# Patient Record
Sex: MALE | Race: WHITE | Employment: FULL TIME | URBAN - METROPOLITAN AREA
[De-identification: names, ages, dates, MRNs, and addresses within clinical notes are randomized per-mention and may not be internally consistent; named-entity substitution may affect disease eponyms.]

---
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
## 2021-10-13 ENCOUNTER — HOSPITAL ENCOUNTER (EMERGENCY)
Age: 21
Discharge: HOME | End: 2021-10-13
Attending: STUDENT IN AN ORGANIZED HEALTH CARE EDUCATION/TRAINING PROGRAM
Payer: COMMERCIAL

## 2021-10-13 ENCOUNTER — APPOINTMENT (OUTPATIENT)
Dept: GENERAL RADIOLOGY | Age: 21
End: 2021-10-13
Attending: PHYSICIAN ASSISTANT
Payer: COMMERCIAL

## 2021-10-13 VITALS
OXYGEN SATURATION: 95 % | BODY MASS INDEX: 22.9 KG/M2 | RESPIRATION RATE: 20 BRPM | SYSTOLIC BLOOD PRESSURE: 145 MMHG | TEMPERATURE: 97.7 F | DIASTOLIC BLOOD PRESSURE: 98 MMHG | HEIGHT: 70 IN | WEIGHT: 160 LBS | HEART RATE: 85 BPM

## 2021-10-13 DIAGNOSIS — R06.02 SOB (SHORTNESS OF BREATH): Primary | ICD-10-CM

## 2021-10-13 DIAGNOSIS — J45.901 EXACERBATION OF ASTHMA, UNSPECIFIED ASTHMA SEVERITY, UNSPECIFIED WHETHER PERSISTENT: ICD-10-CM

## 2021-10-13 PROCEDURE — 74011250637 HC RX REV CODE- 250/637: Performed by: PHYSICIAN ASSISTANT

## 2021-10-13 PROCEDURE — 71045 X-RAY EXAM CHEST 1 VIEW: CPT

## 2021-10-13 PROCEDURE — 94640 AIRWAY INHALATION TREATMENT: CPT

## 2021-10-13 PROCEDURE — 74011250636 HC RX REV CODE- 250/636: Performed by: PHYSICIAN ASSISTANT

## 2021-10-13 PROCEDURE — 99282 EMERGENCY DEPT VISIT SF MDM: CPT

## 2021-10-13 RX ORDER — ALBUTEROL SULFATE 90 UG/1
4 AEROSOL, METERED RESPIRATORY (INHALATION) ONCE
Status: COMPLETED | OUTPATIENT
Start: 2021-10-13 | End: 2021-10-13

## 2021-10-13 RX ORDER — ALBUTEROL SULFATE 0.83 MG/ML
2.5 SOLUTION RESPIRATORY (INHALATION)
Qty: 30 NEBULE | Refills: 0 | Status: SHIPPED | OUTPATIENT
Start: 2021-10-13

## 2021-10-13 RX ORDER — ALBUTEROL SULFATE 90 UG/1
4 AEROSOL, METERED RESPIRATORY (INHALATION)
Qty: 18 G | Refills: 0 | Status: SHIPPED | OUTPATIENT
Start: 2021-10-13

## 2021-10-13 RX ORDER — DEXAMETHASONE 6 MG/1
12 TABLET ORAL ONCE
Status: COMPLETED | OUTPATIENT
Start: 2021-10-13 | End: 2021-10-13

## 2021-10-13 RX ADMIN — ALBUTEROL SULFATE 4 PUFF: 108 INHALANT RESPIRATORY (INHALATION) at 19:53

## 2021-10-13 RX ADMIN — IPRATROPIUM BROMIDE AND ALBUTEROL 4 PUFF: 20; 100 SPRAY, METERED RESPIRATORY (INHALATION) at 18:59

## 2021-10-13 RX ADMIN — DEXAMETHASONE 12 MG: 6 TABLET ORAL at 20:41

## 2021-10-13 NOTE — Clinical Note
[address]  [facility] EMERGENCY DEPT  [address] [id number]  [phone]    Work/School Note    Date: 10/13/2021    To Whom It May concern:    Lacey Shelton was seen and treated today in the emergency room by the following provider(s):  Attending Provider: Ana Rucker MD  Physician Assistant: Marjorie Potts. Lacey Shelton is excused from work/school on 10/13/21 and 10/14/21. He is medically clear to return to work/school on 10/15/2021.        Sincerely,          Remington Coelho PA-C

## 2021-10-13 NOTE — ED PROVIDER NOTES
Patient Call: Voicemail  Date/Time: 8/14/20 1:11PM  Reason for call: Adilia is calling from [facility] Pulmonary Department to help coordinate getting pt set up for further testing and to see the doctor. Please call Adilia back directly at [phone].  Orders can be faxed to [phone], ATTN: Adilia     Ronaldo Gaines is a 24 y.o. male with PMhx of asthma who presents to ED with cc of shortness of breath. Pt states he had a sore throat on Sunday along with some cough and congestion. Notes cough is productive of green/clear mucus. States he has taken Mucinex for this. States he had temperature of 99 yesterday, denies further fevers. Denies chest pain, abdominal pain, nausea, vomiting. States he had some loose stools. Started with SOB (notes hx of asthma) on Monday, went to [facility]. States they gave him a steroid injection which helped symptoms. States he has been using home nebulizer every 4 hours with only short relief. States he is not vaccinated for COVID, denies known exposure. He notes however that his girlfriend started with a cough today. PMHx: Reviewed, as below. PSHx: Reviewed, as below. PCP: Kamryn Avilez, DO    There are no other complaints verbalized at this time. No past medical history on file. No past surgical history on file. No family history on file. Social History     Socioeconomic History    Marital status: SINGLE     Spouse name: Not on file    Number of children: Not on file    Years of education: Not on file    Highest education level: Not on file   Occupational History    Not on file   Tobacco Use    Smoking status: Not on file   Substance and Sexual Activity    Alcohol use: Not on file    Drug use: Not on file    Sexual activity: Not on file   Other Topics Concern    Not on file   Social History Narrative    Not on file     Social Determinants of Health     Financial Resource Strain:     Difficulty of Paying Living Expenses:    Food Insecurity:     Worried About Running Out of Food in the Last Year:     [address] in the Last Year:    Transportation Needs:     Lack of Transportation (Medical):      Lack of Transportation (Non-Medical):    Physical Activity:     Days of Exercise per Week:     Minutes of Exercise per Session:    Stress:     Feeling of Stress :    Social Connections:     Frequency of Communication with Friends and Family:     Frequency of Social Gatherings with Friends and Family:     Attends [employer] Services:     Active Member of Clubs or Organizations:     Attends Club or Organization Meetings:     Marital Status:    Intimate Partner Violence:     Fear of Current or Ex-Partner:     Emotionally Abused:     Physically Abused:     Sexually Abused: ALLERGIES: Patient has no known allergies. Review of Systems   Constitutional: Negative for fever. HENT: Positive for congestion and sore throat. Respiratory: Positive for cough and shortness of breath. Cardiovascular: Negative for chest pain. Gastrointestinal: Negative for abdominal pain, constipation, diarrhea, nausea and vomiting. All other systems reviewed and are negative. Vitals:    10/13/21 1622   BP: (!) 145/98   Pulse: 85   Resp: 20   Temp: 97.7 °F (36.5 °C)   SpO2: 95%   Weight: 72.6 kg (160 lb)   Height: 5' 10\" (1.778 m)            Physical Exam  Vitals and nursing note reviewed. Constitutional:       Appearance: He is not diaphoretic. HENT:      Head: Normocephalic. Right Ear: External ear normal.      Left Ear: External ear normal.   Eyes:      General: No scleral icterus. Cardiovascular:      Rate and Rhythm: Normal rate and regular rhythm. Heart sounds: Normal heart sounds. No murmur heard. Pulmonary:      Effort: Pulmonary effort is normal. No respiratory distress. Breath sounds: No stridor. Wheezing (throughout all lung fields) present. No rhonchi or rales. Abdominal:      General: Bowel sounds are normal. There is no distension. Palpations: Abdomen is soft. There is no mass. Tenderness: There is no abdominal tenderness. There is no guarding or rebound. Hernia: No hernia is present. Musculoskeletal:         General: No swelling or deformity.       Cervical back: Normal range of motion. Skin:     General: Skin is warm and dry. Neurological:      Mental Status: He is alert and oriented to person, place, and time. Mental status is at baseline. MDM  Number of Diagnoses or Management Options     Amount and/or Complexity of Data Reviewed  Clinical lab tests: ordered and reviewed  Tests in the radiology section of CPT®: ordered and reviewed  Discuss the patient with other providers: yes (Dr Diana Noble, ED attending)          Procedures          5:18 PM  Pt states he had a negative rapid COVID test yesterday at [facility]. Declines repeat. States \"I do not want that thing all the way at my nose \"and notes they remained superficially around his nares with test yesterday. 7:37 PM  Patient reassessed. We have discussed results of his chest x-ray. Repeat physical exam demonstrating improved wheezing from previous. 9:08 PM  Pt reassessed. States his symptoms feel improved. He states \"I did some exercise and did not get out of breath as before\". Repeat physical exam demonstrating some end expiratory wheezes however still improved from previous. He maintains declining Covid test AMA. VITAL SIGNS:  Vitals:    10/13/21 1622   BP: (!) 145/98   Pulse: 85   Resp: 20   Temp: 97.7 °F (36.5 °C)   SpO2: 95%   Weight: 72.6 kg (160 lb)   Height: 5' 10\" (1.778 m)         LABS:  No results found for this or any previous visit (from the past 24 hour(s)). IMAGING:  XR CHEST PORT   Final Result   No acute process. Medications During Visit:  Medications   ipratropium-albuterol (COMBIVENT RESPIMAT) 20 mcg-100 mcg inhalation spray (4 Puffs Inhalation Given 10/13/21 1859)   albuterol (PROVENTIL HFA, VENTOLIN HFA, PROAIR HFA) inhaler 4 Puff (4 Puffs Inhalation Given 10/13/21 1953)   dexAMETHasone (DECADRON) tablet 12 mg (12 mg Oral Given 10/13/21 2041)         DECISION MAKING:    Cordell Emmanuel is a 24 y.o. male who comes in as above.   Presents with history of asthma. Describes upper respiratory symptoms of cough, congestion, sore throat since Sunday for which she has taken Mucinex. Had negative rapid Covid test at [facility] yesterday, declined further testing in ED today. Chest x-ray without acute findings. Patient with wheezing on initial physical exam; has been given 4 puffs Combivent followed by 4 puffs albuterol alone, notes subjective improvement as well demonstrates improvement on physical exam.  Maintains without retractions, increased work of breathing, stridor or other signs of acutely impending respiratory compromise during his stay. 95% on RA. We have discussed taking 4 puffs albuterol as needed every 46 hours over the next 2-3 days. He has been given Decadron while in ED. Will have follow-up with his PCP. I have discussed care with ED attending. Pt and I have discussed close return precautions as well as follow up recommendations. Opportunity for questions presented. Pt verbalizes their understanding and agreement with care plan. IMPRESSION:  1. SOB (shortness of breath)    2. Exacerbation of asthma, unspecified asthma severity, unspecified whether persistent        DISPOSITION:  Discharged      Discharge Medication List as of 10/13/2021  9:12 PM      START taking these medications    Details   albuterol (PROVENTIL VENTOLIN) 2.5 mg /3 mL (0.083 %) nebu 3 mL by Nebulization route every four (4) hours as needed for Wheezing., Normal, Disp-30 Nebule, R-0      albuterol (PROVENTIL HFA, VENTOLIN HFA, PROAIR HFA) 90 mcg/actuation inhaler Take 4 Puffs by inhalation every four (4) hours as needed for Wheezing., Normal, Disp-18 g, R-0              Follow-up Information     Follow up With Specialties Details Why Contact Quinn Cooney, DO Family Medicine Call  Please call in the morning regarding your symptoms.  [address]  [phone]      [facility] EMERGENCY DEPT Emergency Medicine Go to  As needed, If symptoms worsen [address] [place] [address]  [phone]            The patient is asked to follow-up with their primary care provider and any other physicians as above. We have discussed strict return precautions and the patient is asked to return promptly for any increased concerns or worsening of symptoms and for return precautions regarding their symptoms today. They can return to this emergency department or any other emergency department. I have discussed with them results as above and presented opportunity for questions. They verbalize their understanding of the above and agreement with care plan. Please note that this dictation was completed with [employer], the [employer] voice recognition software. Quite often unanticipated grammatical, syntax, homophones, and other interpretive errors are inadvertently transcribed by the computer software. Please disregard these errors. Please excuse any errors that have escaped final proofreading.

## 2021-10-13 NOTE — ED TRIAGE NOTES
Pt reports a sore throat onset 3 days ago and SOB onset a couple days ago. Hx of asthma and has been using inhalers and nebulizers at home without relief. Seen at [facility] yesterday and given a shot of steroids with some relief. Worse with walking and talking.

## 2024-11-02 ENCOUNTER — HOSPITAL ENCOUNTER (EMERGENCY)
Facility: HOSPITAL | Age: 24
Discharge: HOME | End: 2024-11-02
Attending: STUDENT IN AN ORGANIZED HEALTH CARE EDUCATION/TRAINING PROGRAM
Payer: COMMERCIAL

## 2024-11-02 VITALS
WEIGHT: 160 LBS | OXYGEN SATURATION: 97 % | HEIGHT: 70 IN | RESPIRATION RATE: 16 BRPM | TEMPERATURE: 97.9 F | BODY MASS INDEX: 22.9 KG/M2 | DIASTOLIC BLOOD PRESSURE: 96 MMHG | SYSTOLIC BLOOD PRESSURE: 159 MMHG | HEART RATE: 82 BPM

## 2024-11-02 DIAGNOSIS — S02.2XXA CLOSED FRACTURE OF NASAL BONE, INITIAL ENCOUNTER: Primary | ICD-10-CM

## 2024-11-02 PROCEDURE — 99283 EMERGENCY DEPT VISIT LOW MDM: CPT

## 2024-11-02 RX ORDER — OXYMETAZOLINE HYDROCHLORIDE 0.05 G/100ML
2 SPRAY NASAL 2 TIMES DAILY
Qty: 12 ML | Refills: 0 | Status: SHIPPED | OUTPATIENT
Start: 2024-11-02 | End: 2024-12-02

## 2024-11-02 RX ORDER — ACETAMINOPHEN 325 MG/1
650 TABLET ORAL EVERY 6 HOURS PRN
Qty: 120 TABLET | Refills: 0 | Status: SHIPPED | OUTPATIENT
Start: 2024-11-02

## 2024-11-02 RX ORDER — IBUPROFEN 600 MG/1
600 TABLET, FILM COATED ORAL 3 TIMES DAILY PRN
Qty: 30 TABLET | Refills: 0 | Status: SHIPPED | OUTPATIENT
Start: 2024-11-02

## 2024-11-02 ASSESSMENT — PAIN DESCRIPTION - PAIN TYPE: TYPE: ACUTE PAIN

## 2024-11-02 ASSESSMENT — PAIN DESCRIPTION - DESCRIPTORS: DESCRIPTORS: ACHING

## 2024-11-02 ASSESSMENT — PAIN SCALES - GENERAL: PAINLEVEL_OUTOF10: 5

## 2024-11-02 ASSESSMENT — PAIN - FUNCTIONAL ASSESSMENT: PAIN_FUNCTIONAL_ASSESSMENT: 0-10

## 2024-11-02 ASSESSMENT — PAIN DESCRIPTION - LOCATION: LOCATION: NOSE
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
## 2024-11-02 NOTE — DISCHARGE INSTRUCTIONS
You should follow sinus precautions as below:    Do not blow your nose for at least two weeks.  Slight bleeding from the nose is not uncommon. You may use direct pressure to stop bleeding as we discussed. Sneeze with an open mouth. Drink without a straw for one week. Avoid swimming for one month.    You may take 600mg ibuprofen every 6 hours or tylenol 650mg every 6 hours as needed for pain. If needed, you can alternate these medications so that you take one medication every 3 hours. For instance, at noon take ibuprofen, then at 3pm take tylenol, then at 6pm take ibuprofen.     Call to schedule follow up with ENT this week as we discussed.
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
## 2024-11-02 NOTE — ED NOTES
The patient was discharged home by Dr Pires in stable condition. The patient is alert and oriented, in no respiratory distress and discharge vital signs obtained. The patient's diagnosis, condition and treatment were explained. The patient expressed understanding. Prescriptions given/e-scribed to pharmacy. No work/school note given. A discharge plan has been developed. A  was not involved in the process. Aftercare instructions were given.  Pt ambulatory out of the ED.